# Patient Record
Sex: MALE | Employment: OTHER | URBAN - METROPOLITAN AREA
[De-identification: names, ages, dates, MRNs, and addresses within clinical notes are randomized per-mention and may not be internally consistent; named-entity substitution may affect disease eponyms.]

---

## 2022-01-31 ENCOUNTER — OFFICE VISIT (OUTPATIENT)
Dept: ORTHOPEDIC SURGERY | Age: 62
End: 2022-01-31
Payer: COMMERCIAL

## 2022-01-31 DIAGNOSIS — M75.121 COMPLETE TEAR OF RIGHT ROTATOR CUFF, UNSPECIFIED WHETHER TRAUMATIC: Primary | ICD-10-CM

## 2022-01-31 DIAGNOSIS — M75.01 ADHESIVE CAPSULITIS OF RIGHT SHOULDER: ICD-10-CM

## 2022-01-31 DIAGNOSIS — M75.111 NONTRAUMATIC INCOMPLETE TEAR OF RIGHT ROTATOR CUFF: ICD-10-CM

## 2022-01-31 PROCEDURE — 99214 OFFICE O/P EST MOD 30 MIN: CPT | Performed by: ORTHOPAEDIC SURGERY

## 2022-01-31 NOTE — PROGRESS NOTES
Pavithra Hall (: 1960) is a 58 y.o. male, patient, here for evaluation of the following chief complaint(s):  Shoulder Pain (right shoulder pain)       HPI:    Patient returns the office with recurrent discomfort of the right shoulder. He was last seen in November and an MRI evaluation was performed. He was doing relatively well. 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 He is here today for follow-up. He he has not noted any swelling or black and blue. He has not noted any deformities. Not on File    No current outpatient medications on file. No current facility-administered medications for this visit. No past medical history on file. No past surgical history on file. No family history on file. Social History     Socioeconomic History    Marital status:      Spouse name: Not on file    Number of children: Not on file    Years of education: Not on file    Highest education level: Not on file   Occupational History    Not on file   Tobacco Use    Smoking status: Not on file    Smokeless tobacco: Not on file   Substance and Sexual Activity    Alcohol use: Not on file    Drug use: Not on file    Sexual activity: Not on file   Other Topics Concern    Not on file   Social History Narrative    Not on file     Social Determinants of Health     Financial Resource Strain:     Difficulty of Paying Living Expenses: Not on file   Food Insecurity:     Worried About Running Out of Food in the Last Year: Not on file    Dionicio of Food in the Last Year: Not on file   Transportation Needs:     Lack of Transportation (Medical): Not on file    Lack of Transportation (Non-Medical):  Not on file   Physical Activity:     Days of Exercise per Week: Not on file    Minutes of Exercise per Session: Not on file   Stress:     Feeling of Stress : Not on file   Social Connections:     Frequency of Communication with Friends and Family: Not on file    Frequency of Social Gatherings with Friends and Family: Not on file    Attends Zoroastrianism Services: Not on file    Active Member of Clubs or Organizations: Not on file    Attends Club or Organization Meetings: Not on file    Marital Status: Not on file   Intimate Partner Violence:     Fear of Current or Ex-Partner: Not on file    Emotionally Abused: Not on file    Physically Abused: Not on file    Sexually Abused: Not on file   Housing Stability:     Unable to Pay for Housing in the Last Year: Not on file    Number of [place] in the Last Year: Not on file    Unstable Housing in the Last Year: Not on file       Review of Systems   Musculoskeletal:        Right shoulder pain       Vitals: There were no vitals taken for this visit. There is no height or weight on file to calculate BMI. Ortho Exam     Right shoulder: No shoulder girdle atrophy. There is no soft tissue swelling, ecchymosis, abrasions or lacerations. Active range of motion of the shoulder is limited to 130° of forward flexion, 120° of lateral abduction and 70° of external rotation. Internal rotation is to the SI joint and is painful. Passive range of motion is full with a painful impingement sign and painful Smith sign. Rotator cuff strength is painful to evaluate and is at 4+/5 with forward flexion and lateral abduction. External rotational strength is maintained. There is no crepitation about the joint. Palpation of the [facility] joint does not reproduce discomfort and there is no pain elicited with cross-body adduction. Strength of the extremity is 5/5 strength at biceps/triceps/wrist extension and is comparable to the contralateral side. DTR's are intact at +2/4 and are symmetrical.  Cervical range of motion is full with no pain to palpation along the paraspinal musculature or medial border of the scapula. Spurling's sign is negative.       MRI evaluation shows changes of tendinopathy and partial-thickness tearing noted along the supraspinatus. There are subtle degenerative changes noted at the posterior superior labrum. ASSESSMENT/PLAN:    I have gone over the MRI with the patient. 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 As far as a recent level of trauma, I do not detect any bicep deformity. He certainly could have exacerbated the underlying tendinopathy pattern of the rotator cuff system. He also has developed a little bit of tightness in the shoulder. At this stage, I think it would be reasonable to proceed with physical therapy to advance his range of motion. Hopefully will control some of his pain. If it does not, potentially surgical intervention would be helpful. I did talk to him about surgery. At this stage, he would like to stay away from surgery. We also talked about cortisone and we both agree with staying away from cortisone.   Patient is to return to the office after 4 weeks of therapy        Amy Samano MD